# Patient Record
Sex: MALE | Race: WHITE | NOT HISPANIC OR LATINO | URBAN - METROPOLITAN AREA
[De-identification: names, ages, dates, MRNs, and addresses within clinical notes are randomized per-mention and may not be internally consistent; named-entity substitution may affect disease eponyms.]

---

## 2020-02-29 ENCOUNTER — EMERGENCY (EMERGENCY)
Facility: HOSPITAL | Age: 20
LOS: 1 days | Discharge: HOME | End: 2020-02-29
Attending: EMERGENCY MEDICINE
Payer: COMMERCIAL

## 2020-02-29 VITALS
OXYGEN SATURATION: 96 % | HEIGHT: 69 IN | SYSTOLIC BLOOD PRESSURE: 116 MMHG | TEMPERATURE: 99 F | RESPIRATION RATE: 18 BRPM | WEIGHT: 235.01 LBS | DIASTOLIC BLOOD PRESSURE: 69 MMHG | HEART RATE: 113 BPM

## 2020-02-29 PROCEDURE — 99284 EMERGENCY DEPT VISIT MOD MDM: CPT

## 2020-02-29 PROCEDURE — 99283 EMERGENCY DEPT VISIT LOW MDM: CPT | Mod: 25

## 2020-02-29 PROCEDURE — 90471 IMMUNIZATION ADMIN: CPT

## 2020-02-29 PROCEDURE — 90715 TDAP VACCINE 7 YRS/> IM: CPT

## 2020-02-29 RX ORDER — TETANUS TOXOID, REDUCED DIPHTHERIA TOXOID AND ACELLULAR PERTUSSIS VACCINE, ADSORBED 5; 2.5; 8; 8; 2.5 [IU]/.5ML; [IU]/.5ML; UG/.5ML; UG/.5ML; UG/.5ML
0.5 SUSPENSION INTRAMUSCULAR ONCE
Refills: 0 | Status: COMPLETED | OUTPATIENT
Start: 2020-02-29 | End: 2020-02-29

## 2020-02-29 RX ADMIN — TETANUS TOXOID, REDUCED DIPHTHERIA TOXOID AND ACELLULAR PERTUSSIS VACCINE, ADSORBED 0.5 MILLILITER(S): 5; 2.5; 8; 8; 2.5 SUSPENSION INTRAMUSCULAR at 16:56

## 2020-02-29 RX ADMIN — Medication 1 TABLET(S): at 16:56

## 2020-02-29 NOTE — ED PROVIDER NOTE - PATIENT PORTAL LINK FT
You can access the FollowMyHealth Patient Portal offered by [facility] by registering at the following website: [email or web address]. By joining [employer]’s FollowMyHealth portal, you will also be able to view your health information using other applications (apps) compatible with our system.
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
## 2020-02-29 NOTE — ED PROVIDER NOTE - CHPI ED SYMPTOMS NEG
no weakness/no loss of consciousness/no back pain/no blurred vision/no change in level of consciousness/no chest wall tenderness/no seizure/no chest pain/no vomiting

## 2023-10-08 ENCOUNTER — OFFICE (OUTPATIENT)
Dept: URBAN - METROPOLITAN AREA CLINIC 12 | Facility: CLINIC | Age: 23
Setting detail: OPHTHALMOLOGY
End: 2023-10-08
Payer: COMMERCIAL

## 2023-10-08 DIAGNOSIS — H00.14: ICD-10-CM

## 2023-10-08 PROCEDURE — 99203 OFFICE O/P NEW LOW 30 MIN: CPT | Performed by: OPTOMETRIST

## 2023-10-08 ASSESSMENT — VISUAL ACUITY
OD_BCVA: 20/20
OS_BCVA: 20/20

## 2023-10-08 ASSESSMENT — CONFRONTATIONAL VISUAL FIELD TEST (CVF)
OS_FINDINGS: FULL
OD_FINDINGS: FULL

## 2023-10-12 ENCOUNTER — OFFICE (OUTPATIENT)
Dept: URBAN - METROPOLITAN AREA CLINIC 12 | Facility: CLINIC | Age: 23
Setting detail: OPHTHALMOLOGY
End: 2023-10-12
Payer: COMMERCIAL

## 2023-10-12 DIAGNOSIS — H00.14: ICD-10-CM

## 2023-10-12 PROCEDURE — 92012 INTRM OPH EXAM EST PATIENT: CPT | Performed by: OPHTHALMOLOGY

## 2023-10-12 ASSESSMENT — REFRACTION_AUTOREFRACTION
OD_AXIS: 018
OS_CYLINDER: -0.25
OS_AXIS: 168
OD_SPHERE: 0.00
OD_CYLINDER: -0.25
OS_SPHERE: PLANO

## 2023-10-12 ASSESSMENT — VISUAL ACUITY
OS_BCVA: 20/20
OD_BCVA: 20/20

## 2023-10-12 ASSESSMENT — KERATOMETRY
OD_K1POWER_DIOPTERS: 41.00
OD_AXISANGLE_DEGREES: 074
OD_K2POWER_DIOPTERS: 41.25
OS_K1POWER_DIOPTERS: 41.50
OS_AXISANGLE_DEGREES: 078
OS_K2POWER_DIOPTERS: 41.75

## 2023-10-12 ASSESSMENT — SPHEQUIV_DERIVED: OD_SPHEQUIV: -0.125

## 2023-10-12 ASSESSMENT — AXIALLENGTH_DERIVED: OD_AL: 24.5504

## 2023-10-12 ASSESSMENT — CONFRONTATIONAL VISUAL FIELD TEST (CVF)
OD_FINDINGS: FULL
OS_FINDINGS: FULL

## 2023-10-16 ENCOUNTER — OFFICE (OUTPATIENT)
Dept: URBAN - METROPOLITAN AREA CLINIC 100 | Facility: CLINIC | Age: 23
Setting detail: OPHTHALMOLOGY
End: 2023-10-16
Payer: COMMERCIAL

## 2023-10-16 ENCOUNTER — RX ONLY (RX ONLY)
Age: 23
End: 2023-10-16

## 2023-10-16 DIAGNOSIS — H00.14: ICD-10-CM

## 2023-10-16 PROCEDURE — 92285 EXTERNAL OCULAR PHOTOGRAPHY: CPT | Performed by: OPHTHALMOLOGY

## 2023-10-16 PROCEDURE — 67800 REMOVE EYELID LESION: CPT | Mod: E1 | Performed by: OPHTHALMOLOGY

## 2023-10-16 ASSESSMENT — LID EXAM ASSESSMENTS
OS_BLEPHARITIS: 1+
OD_BLEPHARITIS: 1+

## 2023-10-16 ASSESSMENT — VISUAL ACUITY
OD_BCVA: 20/20
OS_BCVA: 20/20

## 2023-10-16 ASSESSMENT — KERATOMETRY
OS_AXISANGLE_DEGREES: 078
OS_K1POWER_DIOPTERS: 41.50
OS_K2POWER_DIOPTERS: 41.75
OD_AXISANGLE_DEGREES: 074
OD_K2POWER_DIOPTERS: 41.25
OD_K1POWER_DIOPTERS: 41.00

## 2023-10-16 ASSESSMENT — REFRACTION_AUTOREFRACTION
OS_SPHERE: PLANO
OS_CYLINDER: -0.25
OD_AXIS: 018
OD_CYLINDER: -0.25
OS_AXIS: 168
OD_SPHERE: 0.00

## 2023-10-16 ASSESSMENT — AXIALLENGTH_DERIVED: OD_AL: 24.5504

## 2023-10-16 ASSESSMENT — CONFRONTATIONAL VISUAL FIELD TEST (CVF)
OS_FINDINGS: FULL
OD_FINDINGS: FULL

## 2023-10-16 ASSESSMENT — SPHEQUIV_DERIVED: OD_SPHEQUIV: -0.125

## 2023-10-30 ENCOUNTER — OFFICE (OUTPATIENT)
Dept: URBAN - METROPOLITAN AREA CLINIC 100 | Facility: CLINIC | Age: 23
Setting detail: OPHTHALMOLOGY
End: 2023-10-30
Payer: COMMERCIAL

## 2023-10-30 DIAGNOSIS — L71.0: ICD-10-CM

## 2023-10-30 DIAGNOSIS — H00.14: ICD-10-CM

## 2023-10-30 DIAGNOSIS — H01.004: ICD-10-CM

## 2023-10-30 DIAGNOSIS — H01.001: ICD-10-CM

## 2023-10-30 PROCEDURE — 92012 INTRM OPH EXAM EST PATIENT: CPT | Performed by: OPHTHALMOLOGY

## 2023-10-30 ASSESSMENT — REFRACTION_AUTOREFRACTION
OD_AXIS: 018
OD_CYLINDER: -0.25
OS_CYLINDER: -0.25
OD_SPHERE: 0.00
OS_AXIS: 168
OS_SPHERE: PLANO

## 2023-10-30 ASSESSMENT — KERATOMETRY
OD_K2POWER_DIOPTERS: 41.25
OS_K2POWER_DIOPTERS: 41.75
OD_AXISANGLE_DEGREES: 074
OS_AXISANGLE_DEGREES: 078
OD_K1POWER_DIOPTERS: 41.00
OS_K1POWER_DIOPTERS: 41.50

## 2023-10-30 ASSESSMENT — VISUAL ACUITY
OS_BCVA: 20/20
OD_BCVA: 20/20

## 2023-10-30 ASSESSMENT — CONFRONTATIONAL VISUAL FIELD TEST (CVF)
OS_FINDINGS: FULL
OD_FINDINGS: FULL

## 2023-10-30 ASSESSMENT — LID EXAM ASSESSMENTS
OS_BLEPHARITIS: 1+
OD_BLEPHARITIS: 1+

## 2023-10-30 ASSESSMENT — AXIALLENGTH_DERIVED: OD_AL: 24.5504

## 2023-10-30 ASSESSMENT — SPHEQUIV_DERIVED: OD_SPHEQUIV: -0.125
